# Patient Record
Sex: FEMALE | Race: WHITE | NOT HISPANIC OR LATINO | Employment: OTHER | URBAN - METROPOLITAN AREA
[De-identification: names, ages, dates, MRNs, and addresses within clinical notes are randomized per-mention and may not be internally consistent; named-entity substitution may affect disease eponyms.]

---

## 2023-12-26 ENCOUNTER — LAB (OUTPATIENT)
Dept: LAB | Facility: LAB | Age: 68
End: 2023-12-26
Payer: COMMERCIAL

## 2023-12-26 DIAGNOSIS — R73.9 HYPERGLYCEMIA, UNSPECIFIED: Primary | ICD-10-CM

## 2023-12-26 LAB
ALBUMIN SERPL BCP-MCNC: 4 G/DL (ref 3.4–5)
ALP SERPL-CCNC: 94 U/L (ref 33–136)
ALT SERPL W P-5'-P-CCNC: 13 U/L (ref 7–45)
ANION GAP SERPL CALC-SCNC: 14 MMOL/L (ref 10–20)
AST SERPL W P-5'-P-CCNC: 16 U/L (ref 9–39)
BILIRUB SERPL-MCNC: 0.7 MG/DL (ref 0–1.2)
BUN SERPL-MCNC: 19 MG/DL (ref 6–23)
CALCIUM SERPL-MCNC: 9.9 MG/DL (ref 8.6–10.6)
CHLORIDE SERPL-SCNC: 102 MMOL/L (ref 98–107)
CHOLEST SERPL-MCNC: 194 MG/DL (ref 0–199)
CHOLESTEROL/HDL RATIO: 3
CO2 SERPL-SCNC: 27 MMOL/L (ref 21–32)
CREAT SERPL-MCNC: 0.78 MG/DL (ref 0.5–1.05)
ERYTHROCYTE [DISTWIDTH] IN BLOOD BY AUTOMATED COUNT: 11.9 % (ref 11.5–14.5)
EST. AVERAGE GLUCOSE BLD GHB EST-MCNC: 128 MG/DL
GFR SERPL CREATININE-BSD FRML MDRD: 83 ML/MIN/1.73M*2
GLUCOSE SERPL-MCNC: 127 MG/DL (ref 74–99)
HBA1C MFR BLD: 6.1 %
HCT VFR BLD AUTO: 42.4 % (ref 36–46)
HDLC SERPL-MCNC: 65.4 MG/DL
HGB BLD-MCNC: 14.3 G/DL (ref 12–16)
LDLC SERPL CALC-MCNC: 112 MG/DL
MCH RBC QN AUTO: 30.5 PG (ref 26–34)
MCHC RBC AUTO-ENTMCNC: 33.7 G/DL (ref 32–36)
MCV RBC AUTO: 90 FL (ref 80–100)
NON HDL CHOLESTEROL: 129 MG/DL (ref 0–149)
NRBC BLD-RTO: 0 /100 WBCS (ref 0–0)
PLATELET # BLD AUTO: 313 X10*3/UL (ref 150–450)
POTASSIUM SERPL-SCNC: 3.8 MMOL/L (ref 3.5–5.3)
PROT SERPL-MCNC: 7 G/DL (ref 6.4–8.2)
RBC # BLD AUTO: 4.69 X10*6/UL (ref 4–5.2)
SODIUM SERPL-SCNC: 139 MMOL/L (ref 136–145)
TRIGL SERPL-MCNC: 82 MG/DL (ref 0–149)
TSH SERPL-ACNC: 2.91 MIU/L (ref 0.44–3.98)
VLDL: 16 MG/DL (ref 0–40)
WBC # BLD AUTO: 6.3 X10*3/UL (ref 4.4–11.3)

## 2023-12-26 PROCEDURE — 80061 LIPID PANEL: CPT

## 2023-12-26 PROCEDURE — 80053 COMPREHEN METABOLIC PANEL: CPT

## 2023-12-26 PROCEDURE — 84443 ASSAY THYROID STIM HORMONE: CPT

## 2023-12-26 PROCEDURE — 36415 COLL VENOUS BLD VENIPUNCTURE: CPT

## 2023-12-26 PROCEDURE — 85027 COMPLETE CBC AUTOMATED: CPT

## 2023-12-26 PROCEDURE — 83036 HEMOGLOBIN GLYCOSYLATED A1C: CPT

## 2023-12-27 ENCOUNTER — ANCILLARY PROCEDURE (OUTPATIENT)
Dept: RADIOLOGY | Facility: CLINIC | Age: 68
End: 2023-12-27
Payer: COMMERCIAL

## 2023-12-27 VITALS — BODY MASS INDEX: 33.04 KG/M2 | HEIGHT: 61 IN | WEIGHT: 175 LBS

## 2023-12-27 DIAGNOSIS — Z13.820 ENCOUNTER FOR SCREENING FOR OSTEOPOROSIS: ICD-10-CM

## 2023-12-27 DIAGNOSIS — Z12.31 ENCOUNTER FOR SCREENING MAMMOGRAM FOR MALIGNANT NEOPLASM OF BREAST: ICD-10-CM

## 2023-12-27 PROCEDURE — 77085 DXA BONE DENSITY AXL VRT FX: CPT

## 2023-12-27 PROCEDURE — 77067 SCR MAMMO BI INCL CAD: CPT

## 2024-01-03 ENCOUNTER — APPOINTMENT (OUTPATIENT)
Dept: RADIOLOGY | Facility: CLINIC | Age: 69
End: 2024-01-03
Payer: COMMERCIAL

## 2024-01-04 ENCOUNTER — OFFICE VISIT (OUTPATIENT)
Dept: PRIMARY CARE | Facility: CLINIC | Age: 69
End: 2024-01-04
Payer: COMMERCIAL

## 2024-01-04 VITALS
HEIGHT: 61 IN | WEIGHT: 183 LBS | BODY MASS INDEX: 34.55 KG/M2 | SYSTOLIC BLOOD PRESSURE: 146 MMHG | DIASTOLIC BLOOD PRESSURE: 80 MMHG

## 2024-01-04 DIAGNOSIS — Z00.00 HEALTH CARE MAINTENANCE: ICD-10-CM

## 2024-01-04 DIAGNOSIS — I10 BENIGN ESSENTIAL HYPERTENSION: ICD-10-CM

## 2024-01-04 DIAGNOSIS — K57.30 DIVERTICULOSIS OF LARGE INTESTINE WITHOUT HEMORRHAGE: Primary | ICD-10-CM

## 2024-01-04 DIAGNOSIS — E78.2 MIXED HYPERLIPIDEMIA: ICD-10-CM

## 2024-01-04 DIAGNOSIS — R73.9 HYPERGLYCEMIA: ICD-10-CM

## 2024-01-04 DIAGNOSIS — K58.0 IRRITABLE BOWEL SYNDROME WITH DIARRHEA: ICD-10-CM

## 2024-01-04 PROBLEM — R30.0 BURNING WITH URINATION: Status: ACTIVE | Noted: 2024-01-04

## 2024-01-04 PROBLEM — E78.00 PURE HYPERCHOLESTEROLEMIA: Status: ACTIVE | Noted: 2024-01-04

## 2024-01-04 PROBLEM — R01.1 HEART MURMUR, SYSTOLIC: Status: ACTIVE | Noted: 2024-01-04

## 2024-01-04 PROBLEM — Z86.79 HISTORY OF HYPERTENSION: Status: ACTIVE | Noted: 2024-01-04

## 2024-01-04 PROBLEM — R59.1 LYMPHADENOPATHY: Status: ACTIVE | Noted: 2024-01-04

## 2024-01-04 PROBLEM — R06.09 DYSPNEA ON EXERTION: Status: ACTIVE | Noted: 2024-01-04

## 2024-01-04 PROBLEM — R59.9 ADENOPATHY: Status: ACTIVE | Noted: 2024-01-04

## 2024-01-04 PROBLEM — H91.93 BILATERAL HEARING LOSS: Status: ACTIVE | Noted: 2024-01-04

## 2024-01-04 PROBLEM — K21.9 GERD (GASTROESOPHAGEAL REFLUX DISEASE): Status: ACTIVE | Noted: 2024-01-04

## 2024-01-04 PROBLEM — N20.0 CALCULUS OF KIDNEY: Status: ACTIVE | Noted: 2024-01-04

## 2024-01-04 PROBLEM — N39.0 URINARY TRACT INFECTION: Status: ACTIVE | Noted: 2024-01-04

## 2024-01-04 PROBLEM — K57.92 DIVERTICULITIS OF INTESTINE: Status: ACTIVE | Noted: 2024-01-04

## 2024-01-04 PROBLEM — N20.0 NEPHROLITHIASIS: Status: ACTIVE | Noted: 2024-01-04

## 2024-01-04 PROBLEM — E78.5 HYPERLIPIDEMIA: Status: ACTIVE | Noted: 2024-01-04

## 2024-01-04 PROBLEM — E66.9 OBESITY: Status: ACTIVE | Noted: 2024-01-04

## 2024-01-04 PROBLEM — E66.9 OBESITY WITH BODY MASS INDEX 30 OR GREATER: Status: ACTIVE | Noted: 2024-01-04

## 2024-01-04 PROBLEM — N89.8 VAGINAL DISCHARGE: Status: ACTIVE | Noted: 2024-01-04

## 2024-01-04 PROBLEM — J20.9 ACUTE BRONCHITIS: Status: ACTIVE | Noted: 2024-01-04

## 2024-01-04 PROBLEM — H61.23 HEARING LOSS OF BOTH EARS DUE TO CERUMEN IMPACTION: Status: ACTIVE | Noted: 2024-01-04

## 2024-01-04 PROBLEM — L40.9 PSORIASIS: Status: ACTIVE | Noted: 2024-01-04

## 2024-01-04 PROBLEM — K11.20 PAROTITIS: Status: ACTIVE | Noted: 2024-01-04

## 2024-01-04 PROBLEM — E78.00 FAMILIAL HYPERLIPOPROTEINEMIA TYPE IIA: Status: ACTIVE | Noted: 2024-01-04

## 2024-01-04 PROBLEM — R10.9 ABDOMINAL PAIN: Status: ACTIVE | Noted: 2024-01-04

## 2024-01-04 PROBLEM — J01.90 ACUTE SINUSITIS: Status: ACTIVE | Noted: 2024-01-04

## 2024-01-04 PROBLEM — H66.90 EAR INFECTION: Status: ACTIVE | Noted: 2024-01-04

## 2024-01-04 PROBLEM — K57.92 ACUTE DIVERTICULITIS: Status: ACTIVE | Noted: 2024-01-04

## 2024-01-04 PROBLEM — E87.6 HYPOKALEMIA: Status: ACTIVE | Noted: 2024-01-04

## 2024-01-04 PROBLEM — H60.549 ECZEMA OF EXTERNAL AUDITORY CANAL: Status: ACTIVE | Noted: 2024-01-04

## 2024-01-04 PROBLEM — R35.0 URINE FREQUENCY: Status: ACTIVE | Noted: 2024-01-04

## 2024-01-04 PROBLEM — K11.20 SIALOADENITIS: Status: ACTIVE | Noted: 2024-01-04

## 2024-01-04 PROBLEM — R31.9 HEMATURIA: Status: ACTIVE | Noted: 2024-01-04

## 2024-01-04 PROCEDURE — 1036F TOBACCO NON-USER: CPT | Performed by: INTERNAL MEDICINE

## 2024-01-04 PROCEDURE — 3077F SYST BP >= 140 MM HG: CPT | Performed by: INTERNAL MEDICINE

## 2024-01-04 PROCEDURE — 99214 OFFICE O/P EST MOD 30 MIN: CPT | Performed by: INTERNAL MEDICINE

## 2024-01-04 PROCEDURE — 3079F DIAST BP 80-89 MM HG: CPT | Performed by: INTERNAL MEDICINE

## 2024-01-04 PROCEDURE — 1159F MED LIST DOCD IN RCRD: CPT | Performed by: INTERNAL MEDICINE

## 2024-01-04 RX ORDER — LOSARTAN POTASSIUM AND HYDROCHLOROTHIAZIDE 25; 100 MG/1; MG/1
1 TABLET ORAL
Qty: 90 TABLET | Refills: 1 | Status: SHIPPED | OUTPATIENT
Start: 2024-01-04

## 2024-01-04 RX ORDER — LOSARTAN POTASSIUM AND HYDROCHLOROTHIAZIDE 25; 100 MG/1; MG/1
1 TABLET ORAL
COMMUNITY
Start: 2021-03-08 | End: 2024-01-04 | Stop reason: SDUPTHER

## 2024-01-04 ASSESSMENT — ENCOUNTER SYMPTOMS
DEPRESSION: 0
LOSS OF SENSATION IN FEET: 0
OCCASIONAL FEELINGS OF UNSTEADINESS: 0

## 2024-01-15 NOTE — PROGRESS NOTES
"Subjective   Patient ID: Rosa M Bruce is a 68 y.o. female who presents for Follow-up and Med Refill.    Med Refill    Patient is here for follow-up  Needs medication refill  Follow-up on hypertension high cholesterol  Had colonoscopy shows diverticulosis and hemorrhoids  Since then she is getting cramping diarrhea is.  She does have history of IBS      Patient is here for follow-up on results  Refused to put weight  Did not take blood pressure medication today  Wants shingles vaccine     Patient is here for physical  She is due for mammogram and bone density  She is taking blood pressure medication but she has not done blood work for last 2 years  She had psoriasis in the years uses the steroid cream and that is helping her ears  She is due for colonoscopy in 2023  She had episode of abdominal pain diarrhea constipation but resolved now     Follow-up on the results  Cerumen impaction in both ears right ear was hurting a little  She still did not do blood work for cholesterol and sugar  Blood pressure is doing okay     Patient presents with complaints of having possibly asthma feels cannot breathe has to always take deep breaths  for f/u   follow-up on hypertension needs medication refill  did not do blood work   Review of Systems    Objective   /80   Ht 1.549 m (5' 1\")   Wt 83 kg (183 lb)   BMI 34.58 kg/m²     Physical Exam  Vitals reviewed.   Constitutional:       Appearance: Normal appearance.   HENT:      Head: Normocephalic and atraumatic.      Right Ear: Tympanic membrane, ear canal and external ear normal.      Left Ear: Tympanic membrane, ear canal and external ear normal.      Nose: Nose normal.      Mouth/Throat:      Pharynx: Oropharynx is clear.   Eyes:      Extraocular Movements: Extraocular movements intact.      Conjunctiva/sclera: Conjunctivae normal.      Pupils: Pupils are equal, round, and reactive to light.   Cardiovascular:      Rate and Rhythm: Normal rate and regular rhythm.      " Pulses: Normal pulses.      Heart sounds: Normal heart sounds.   Pulmonary:      Effort: Pulmonary effort is normal.      Breath sounds: Normal breath sounds.   Abdominal:      General: Abdomen is flat. Bowel sounds are normal.      Palpations: Abdomen is soft.   Musculoskeletal:      Cervical back: Normal range of motion and neck supple.   Skin:     General: Skin is warm and dry.   Neurological:      General: No focal deficit present.      Mental Status: She is alert and oriented to person, place, and time.   Psychiatric:         Mood and Affect: Mood normal.         Assessment/Plan   Problem List Items Addressed This Visit             ICD-10-CM       Cardiac and Vasculature    Hyperlipidemia E78.5    Relevant Orders    Comprehensive Metabolic Panel    Lipid Panel    Hemoglobin A1C    Benign essential hypertension I10    Relevant Medications    losartan-hydrochlorothiazide (Hyzaar) 100-25 mg tablet    Other Relevant Orders    Comprehensive Metabolic Panel    Lipid Panel    Hemoglobin A1C       Endocrine/Metabolic    Hyperglycemia R73.9    Relevant Orders    Comprehensive Metabolic Panel    Lipid Panel    Hemoglobin A1C     Other Visit Diagnoses         Codes    Health care maintenance     Z00.00    Relevant Orders    CT cardiac scoring wo IV contrast          past recap  Physical normal  Routine blood work ordered CBC CMP fasting with TSH  EKG done shows normal sinus rhythm  Flu shot given  Mammogram ordered  Bone density ordered  Refer to Dr. thibodeaux for ptosis surgery        1/18/23  Blood work reviewed  Cholesterol is still high  Patient does not want to go on medication  Blood sugars elevated  Risk for diabetes discussed  Follow-up blood work in 6 months  Continue current medication  Shingles vaccine ordered  Again discussed diet exercise    1/4/2024  Blood pressure slightly elevated  Refills given on medication  Discussed diet and exercise  Cholesterol diet chart given  LDL has come down a little  Blood sugars  are going up  Discussed risk for diabetes  Will check CT cardiac scoring  Increase fiber in the diet  Follow-up in 6 months

## 2024-05-26 ENCOUNTER — APPOINTMENT (OUTPATIENT)
Dept: RADIOLOGY | Facility: HOSPITAL | Age: 69
End: 2024-05-26
Payer: MEDICARE

## 2024-05-26 ENCOUNTER — HOSPITAL ENCOUNTER (EMERGENCY)
Facility: HOSPITAL | Age: 69
Discharge: HOME | End: 2024-05-26
Attending: STUDENT IN AN ORGANIZED HEALTH CARE EDUCATION/TRAINING PROGRAM
Payer: MEDICARE

## 2024-05-26 ENCOUNTER — APPOINTMENT (OUTPATIENT)
Dept: CARDIOLOGY | Facility: HOSPITAL | Age: 69
End: 2024-05-26
Payer: MEDICARE

## 2024-05-26 VITALS
TEMPERATURE: 98.2 F | HEART RATE: 95 BPM | DIASTOLIC BLOOD PRESSURE: 74 MMHG | SYSTOLIC BLOOD PRESSURE: 183 MMHG | OXYGEN SATURATION: 96 % | BODY MASS INDEX: 33.99 KG/M2 | WEIGHT: 180 LBS | RESPIRATION RATE: 19 BRPM | HEIGHT: 61 IN

## 2024-05-26 DIAGNOSIS — J20.9 ACUTE BRONCHITIS, UNSPECIFIED ORGANISM: Primary | ICD-10-CM

## 2024-05-26 LAB
ALBUMIN SERPL-MCNC: 3.8 G/DL (ref 3.5–5)
ALP BLD-CCNC: 102 U/L (ref 35–125)
ALT SERPL-CCNC: 14 U/L (ref 5–40)
ANION GAP SERPL CALC-SCNC: 10 MMOL/L
AST SERPL-CCNC: 17 U/L (ref 5–40)
BASOPHILS # BLD AUTO: 0.04 X10*3/UL (ref 0–0.1)
BASOPHILS NFR BLD AUTO: 0.7 %
BILIRUB SERPL-MCNC: 0.2 MG/DL (ref 0.1–1.2)
BUN SERPL-MCNC: 17 MG/DL (ref 8–25)
CALCIUM SERPL-MCNC: 8.8 MG/DL (ref 8.5–10.4)
CHLORIDE SERPL-SCNC: 105 MMOL/L (ref 97–107)
CO2 SERPL-SCNC: 24 MMOL/L (ref 24–31)
CREAT SERPL-MCNC: 0.7 MG/DL (ref 0.4–1.6)
EGFRCR SERPLBLD CKD-EPI 2021: >90 ML/MIN/1.73M*2
EOSINOPHIL # BLD AUTO: 0.31 X10*3/UL (ref 0–0.7)
EOSINOPHIL NFR BLD AUTO: 5.7 %
ERYTHROCYTE [DISTWIDTH] IN BLOOD BY AUTOMATED COUNT: 12 % (ref 11.5–14.5)
FLUAV RNA RESP QL NAA+PROBE: NOT DETECTED
FLUBV RNA RESP QL NAA+PROBE: NOT DETECTED
GLUCOSE SERPL-MCNC: 192 MG/DL (ref 65–99)
HCT VFR BLD AUTO: 38.8 % (ref 36–46)
HGB BLD-MCNC: 13.4 G/DL (ref 12–16)
IMM GRANULOCYTES # BLD AUTO: 0.01 X10*3/UL (ref 0–0.7)
IMM GRANULOCYTES NFR BLD AUTO: 0.2 % (ref 0–0.9)
LYMPHOCYTES # BLD AUTO: 1.24 X10*3/UL (ref 1.2–4.8)
LYMPHOCYTES NFR BLD AUTO: 22.9 %
MCH RBC QN AUTO: 31.4 PG (ref 26–34)
MCHC RBC AUTO-ENTMCNC: 34.5 G/DL (ref 32–36)
MCV RBC AUTO: 91 FL (ref 80–100)
MONOCYTES # BLD AUTO: 0.42 X10*3/UL (ref 0.1–1)
MONOCYTES NFR BLD AUTO: 7.8 %
NEUTROPHILS # BLD AUTO: 3.39 X10*3/UL (ref 1.2–7.7)
NEUTROPHILS NFR BLD AUTO: 62.7 %
NRBC BLD-RTO: 0 /100 WBCS (ref 0–0)
NT-PROBNP SERPL-MCNC: 70 PG/ML (ref 0–353)
PLATELET # BLD AUTO: 228 X10*3/UL (ref 150–450)
POTASSIUM SERPL-SCNC: 3.5 MMOL/L (ref 3.4–5.1)
PROT SERPL-MCNC: 7 G/DL (ref 5.9–7.9)
RBC # BLD AUTO: 4.27 X10*6/UL (ref 4–5.2)
SARS-COV-2 RNA RESP QL NAA+PROBE: NOT DETECTED
SODIUM SERPL-SCNC: 139 MMOL/L (ref 133–145)
TROPONIN T SERPL-MCNC: 7 NG/L
WBC # BLD AUTO: 5.4 X10*3/UL (ref 4.4–11.3)

## 2024-05-26 PROCEDURE — 87636 SARSCOV2 & INF A&B AMP PRB: CPT | Performed by: PHYSICIAN ASSISTANT

## 2024-05-26 PROCEDURE — 36415 COLL VENOUS BLD VENIPUNCTURE: CPT | Performed by: PHYSICIAN ASSISTANT

## 2024-05-26 PROCEDURE — 84075 ASSAY ALKALINE PHOSPHATASE: CPT | Performed by: PHYSICIAN ASSISTANT

## 2024-05-26 PROCEDURE — 93005 ELECTROCARDIOGRAM TRACING: CPT

## 2024-05-26 PROCEDURE — 2500000004 HC RX 250 GENERAL PHARMACY W/ HCPCS (ALT 636 FOR OP/ED): Performed by: PHYSICIAN ASSISTANT

## 2024-05-26 PROCEDURE — 2500000002 HC RX 250 W HCPCS SELF ADMINISTERED DRUGS (ALT 637 FOR MEDICARE OP, ALT 636 FOR OP/ED): Performed by: PHYSICIAN ASSISTANT

## 2024-05-26 PROCEDURE — 94640 AIRWAY INHALATION TREATMENT: CPT

## 2024-05-26 PROCEDURE — 83880 ASSAY OF NATRIURETIC PEPTIDE: CPT | Performed by: PHYSICIAN ASSISTANT

## 2024-05-26 PROCEDURE — 99284 EMERGENCY DEPT VISIT MOD MDM: CPT | Mod: 25

## 2024-05-26 PROCEDURE — 96374 THER/PROPH/DIAG INJ IV PUSH: CPT

## 2024-05-26 PROCEDURE — 84484 ASSAY OF TROPONIN QUANT: CPT | Performed by: PHYSICIAN ASSISTANT

## 2024-05-26 PROCEDURE — 71046 X-RAY EXAM CHEST 2 VIEWS: CPT

## 2024-05-26 PROCEDURE — 85025 COMPLETE CBC W/AUTO DIFF WBC: CPT | Performed by: PHYSICIAN ASSISTANT

## 2024-05-26 PROCEDURE — 71046 X-RAY EXAM CHEST 2 VIEWS: CPT | Performed by: RADIOLOGY

## 2024-05-26 PROCEDURE — 96375 TX/PRO/DX INJ NEW DRUG ADDON: CPT

## 2024-05-26 RX ORDER — LORATADINE 10 MG/1
10 TABLET ORAL DAILY
Qty: 30 TABLET | Refills: 0 | Status: SHIPPED | OUTPATIENT
Start: 2024-05-26

## 2024-05-26 RX ORDER — KETOROLAC TROMETHAMINE 30 MG/ML
15 INJECTION, SOLUTION INTRAMUSCULAR; INTRAVENOUS ONCE
Status: COMPLETED | OUTPATIENT
Start: 2024-05-26 | End: 2024-05-26

## 2024-05-26 RX ORDER — IPRATROPIUM BROMIDE AND ALBUTEROL SULFATE 2.5; .5 MG/3ML; MG/3ML
3 SOLUTION RESPIRATORY (INHALATION) ONCE
Status: COMPLETED | OUTPATIENT
Start: 2024-05-26 | End: 2024-05-26

## 2024-05-26 RX ORDER — PREDNISONE 20 MG/1
TABLET ORAL DAILY
Qty: 12 TABLET | Refills: 0 | Status: SHIPPED | OUTPATIENT
Start: 2024-05-26 | End: 2024-06-03

## 2024-05-26 RX ORDER — ALBUTEROL SULFATE 90 UG/1
2 AEROSOL, METERED RESPIRATORY (INHALATION) EVERY 4 HOURS PRN
Qty: 18 G | Refills: 0 | Status: SHIPPED | OUTPATIENT
Start: 2024-05-26 | End: 2024-06-25

## 2024-05-26 RX ORDER — GUAIFENESIN 600 MG/1
1200 TABLET, EXTENDED RELEASE ORAL 2 TIMES DAILY
Qty: 28 TABLET | Refills: 0 | Status: SHIPPED | OUTPATIENT
Start: 2024-05-26 | End: 2024-06-02

## 2024-05-26 RX ADMIN — IPRATROPIUM BROMIDE AND ALBUTEROL SULFATE 3 ML: 2.5; .5 SOLUTION RESPIRATORY (INHALATION) at 14:40

## 2024-05-26 RX ADMIN — IPRATROPIUM BROMIDE AND ALBUTEROL SULFATE 3 ML: 2.5; .5 SOLUTION RESPIRATORY (INHALATION) at 14:17

## 2024-05-26 RX ADMIN — METHYLPREDNISOLONE SODIUM SUCCINATE 125 MG: 125 INJECTION, POWDER, FOR SOLUTION INTRAMUSCULAR; INTRAVENOUS at 14:13

## 2024-05-26 RX ADMIN — KETOROLAC TROMETHAMINE 15 MG: 30 INJECTION, SOLUTION INTRAMUSCULAR at 14:14

## 2024-05-26 ASSESSMENT — LIFESTYLE VARIABLES
HAVE PEOPLE ANNOYED YOU BY CRITICIZING YOUR DRINKING: NO
EVER HAD A DRINK FIRST THING IN THE MORNING TO STEADY YOUR NERVES TO GET RID OF A HANGOVER: NO
TOTAL SCORE: 0
HAVE YOU EVER FELT YOU SHOULD CUT DOWN ON YOUR DRINKING: NO
EVER FELT BAD OR GUILTY ABOUT YOUR DRINKING: NO

## 2024-05-26 ASSESSMENT — PAIN - FUNCTIONAL ASSESSMENT: PAIN_FUNCTIONAL_ASSESSMENT: 0-10

## 2024-05-26 ASSESSMENT — PAIN SCALES - GENERAL
PAINLEVEL_OUTOF10: 7
PAINLEVEL_OUTOF10: 4

## 2024-05-26 ASSESSMENT — PAIN DESCRIPTION - ORIENTATION: ORIENTATION: UPPER

## 2024-05-26 ASSESSMENT — PAIN DESCRIPTION - LOCATION: LOCATION: CHEST

## 2024-05-26 ASSESSMENT — PAIN DESCRIPTION - DIRECTION: RADIATING_TOWARDS: UPPER BACK

## 2024-05-26 NOTE — ED TRIAGE NOTES
Pt complains of being short of breath since Tuesday, states she cannot breathe and its getting worse. Pt says she has chest pains when she takes a deep breath.

## 2024-05-26 NOTE — ED PROVIDER NOTES
"HPI   Chief Complaint   Patient presents with    Shortness of Breath       This is a 68-year-old female with a past medical history of hypertension presenting to the emergency room with complaints of wheezing and productive cough since Tuesday.  Patient states that she has had worsening symptoms not of improvement.  She is coughing up green mucus.  There is no hemoptysis.  She states that she hears herself wheezing.  She states that she may have a history of \"reactive airway disease \"but she does not have any inhalers at home.  No history of COPD or asthma per patient.  Patient denies fevers.  She states that she does have pain in her chest with the coughing.  No nausea or diarrhea or vomiting.  No recent travel.  No recent surgeries.  No history of cancer.  No history of blood clots in her legs or lungs.  Not take any hormonal medications.      Please see HPI for pertinent positive and negative ROS.                   Alie Coma Scale Score: 15                     Patient History   Past Medical History:   Diagnosis Date    HTN (hypertension)     Personal history of other diseases of the circulatory system     History of hypertension     Past Surgical History:   Procedure Laterality Date    APPENDECTOMY  04/24/2013    Appendectomy    KNEE SURGERY  04/24/2013    Knee Surgery Right     Family History   Problem Relation Name Age of Onset    Breast cancer Mother  62     Social History     Tobacco Use    Smoking status: Never    Smokeless tobacco: Never   Substance Use Topics    Alcohol use: Never    Drug use: Never       Physical Exam   ED Triage Vitals [05/26/24 1323]   Temperature Heart Rate Respirations BP   36.8 °C (98.2 °F) 95 19 (!) 183/74      Pulse Ox Temp Source Heart Rate Source Patient Position   96 % Oral Monitor Sitting      BP Location FiO2 (%)     Left arm --       Physical Exam  GENERAL APPEARANCE: Awake and alert. No acute respiratory distress.  Patient is talking and smooth nondyspneic " sentences  VITAL SIGNS: As per the nurses' triage record.  HEENT: Normocephalic, atraumatic. Extraocular muscles are intact. Conjunctiva are pink. Negative scleral icterus. Mucous membranes are moist. Tongue in the midline. Oropharynx clear, uvula midline.   NECK: Soft, nontender and supple, full gross ROM, no meningeal signs.  CHEST: Nontender to palpation.  Diffuse inspiratory wheezing and rhonchi bilaterally.  Symmetric rise and fall of chest wall.   HEART: Clear S1 and S2. Regular rate and rhythm. No murmurs appreciated on auscultation.  Strong and equal pulses in the extremities.  ABDOMEN: Soft, nontender, nondistended  MUSCULOSKELETAL: The calves are nontender to palpation.  No erythema or edema of calfs bilaterally.  No pitting edema bilaterally.  Full gross active range of motion. Ambulating on own with no acute difficulties  NEUROLOGICAL: Awake, alert and oriented x 3. Motor power intact in the upper and lower extremities. Sensation is intact to light touch in the upper and lower extremities. Patient answering questions appropriately.   IMMUNOLOGICAL: No lymphatic streaking noted  DERMATOLOGIC: Warm and dry without petechiae, rashes, or ecchymosis noted on visible skin.   PYSCH: Cooperative with appropriate mood and affect.  ED Course & MDM   ED Course as of 05/26/24 1611   Sun May 26, 2024   1355 EKG interpretation: Normal sinus rhythm, rate 80.  Borderline leftward axis.  No ST or T wave abnormalities. [ML]      ED Course User Index  [ML] Milton Anderson MD         Diagnoses as of 05/26/24 1611   Acute bronchitis, unspecified organism       Medical Decision Making  Parts of this chart have been completed using voice recognition software. Please excuse any errors of transcription.  My thought process and reason for plan has been formulated from the time that I saw the patient until the time of disposition and is not specific to one specific moment during their visit and furthermore my MDM encompasses this  entire chart and not only this text box.      HPI: Detailed above.    Exam: A medically appropriate exam performed, outlined above, given the known history and presentation.    History obtained from: Patient    EKG: See my supervising physician's EKG interpretation    Medications given during visit:  Medications   ipratropium-albuteroL (Duo-Neb) 0.5-2.5 mg/3 mL nebulizer solution 3 mL (3 mL nebulization Given 5/26/24 1440)   ipratropium-albuteroL (Duo-Neb) 0.5-2.5 mg/3 mL nebulizer solution 3 mL (3 mL nebulization Given 5/26/24 1417)   methylPREDNISolone sod succinate (SOLU-Medrol) injection 125 mg (125 mg intravenous Given 5/26/24 1413)   ketorolac (Toradol) injection 15 mg (15 mg intravenous Given 5/26/24 1414)        Diagnostic/tests  Labs Reviewed   COMPREHENSIVE METABOLIC PANEL - Abnormal       Result Value    Glucose 192 (*)     Sodium 139      Potassium 3.5      Chloride 105      Bicarbonate 24      Urea Nitrogen 17      Creatinine 0.70      eGFR >90      Calcium 8.8      Albumin 3.8      Alkaline Phosphatase 102      Total Protein 7.0      AST 17      Bilirubin, Total 0.2      ALT 14      Anion Gap 10     SARS-COV-2 PCR - Normal    Coronavirus 2019, PCR Not Detected      Narrative:     This assay has received FDA Emergency Use Authorization (EUA) and is only authorized for the duration of time that circumstances exist to justify the authorization of the emergency use of in vitro diagnostic tests for the detection of SARS-CoV-2 virus and/or diagnosis of COVID-19 infection under section 564(b)(1) of the Act, 21 U.S.C. 360bbb-3(b)(1). This assay is an in vitro diagnostic nucleic acid amplification test for the qualitative detection of SARS-CoV-2 from nasopharyngeal specimens and has been validated for use at [facility]. Negative results do not preclude COVID-19 infections and should not be used as the sole basis for diagnosis, treatment, or other management decisions.     INFLUENZA A AND  B PCR - Normal    Flu A Result Not Detected      Flu B Result Not Detected      Narrative:     This assay is an in vitro diagnostic multiplex nucleic acid amplification test for the detection and discrimination of Influenza A & B from nasopharyngeal specimens, and has been validated for use at [facility]. Negative results do not preclude Influenza A/B infections, and should not be used as the sole basis for diagnosis, treatment, or other management decisions. If Influenza A/B and RSV PCR results are negative, testing for Parainfluenza virus, Adenovirus and Metapneumovirus is routinely performed for [facility] [place], and is available on other patients by placing an add-on request.   N-TERMINAL PROBNP - Normal    PROBNP 70      Narrative:     Reference ranges are based on clinical submission data. These ranges represent the 95th percentile of normal cut-off points. As NT Pro- BNP values approach 1000 pg/ml, clinical symptoms are more likely associated with CHF.   SERIAL TROPONIN, INITIAL ([facility]) - Normal    Troponin T, High Sensitivity 7     CBC WITH AUTO DIFFERENTIAL    WBC 5.4      nRBC 0.0      RBC 4.27      Hemoglobin 13.4      Hematocrit 38.8      MCV 91      MCH 31.4      MCHC 34.5      RDW 12.0      Platelets 228      Neutrophils % 62.7      Immature Granulocytes %, Automated 0.2      Lymphocytes % 22.9      Monocytes % 7.8      Eosinophils % 5.7      Basophils % 0.7      Neutrophils Absolute 3.39      Immature Granulocytes Absolute, Automated 0.01      Lymphocytes Absolute 1.24      Monocytes Absolute 0.42      Eosinophils Absolute 0.31      Basophils Absolute 0.04     TROPONIN T SERIES, HIGH SENSITIVITY (0, 2 HR, 6 HR)    Narrative:     The following orders were created for panel order Troponin T Series, High Sensitivity (0, 2HR, 6HR).  Procedure                               Abnormality         Status                     ---------                                -----------         ------                     Serial Troponin, Initial...[[id number]]  Normal              Final result               Serial Troponin, 2 Hour ...[[id number]]                                                   Please view results for these tests on the individual orders.   SERIAL TROPONIN,  2 HOUR ([facility])      XR chest 2 views   Final Result   No acute cardiopulmonary disease.        Signed by: Lizy Newberry 5/26/2024 2:46 PM   Dictation workstation:   [id number]           Considerations/further MDM:  Patient was seen in conjucntion with my supervising physician,  Dr. gomez. Please refer to his note.    Patient presents with elevated blood pressure at 183/74, temperature 98.2 °F, heart rate 95 bpm, respirations 19, 96% on room air    Differential diagnosis includes was not limited to asthma exacerbation versus COPD exacerbation versus bronchitis versus pneumonia versus ACS including myocardial infarction versus pneumothorax versus pulmonary embolism    HEART SCORE 3    Chest x-ray shows no acute cardiopulmonary process.  Negative viral swabs.  Troponin T 7.  proBNP 70.  CBC shows no leukocytosis.  CMP unremarkable.  Patient was treated with IV Toradol, DuoNebs x 2 and IV Solu-Medrol.  Due to no leukocytosis with unremarkable chest x-ray low suspicion for bacterial pneumonia or atypical pneumonia.  Higher suspicion for airway inflammation/reactive airway disease.  I will discharge patient with albuterol inhaler, prednisone, Mucinex and recommend daily Claritin or Zyrtec.  She is to follow-up with her PCP, Dr. CARTER    The patient was evaluated in the emergency department and an etiology requiring emergent treatment or admission to hospital was not identified.  All labs, imaging, and diagnostic studies were reviewed by me and the patient was counseled on clinical impression, expectations, and plan.  Patient was educated to follow-up with PCP in the following 1 to 3 days.  All questions from  patient were answered.  They elicited understanding and were agreeable to course of treatment.  Patient was discharged in stable condition and given strict return precautions including new or worsening symptoms.      Procedure  Procedures     Ariadna Koehler PA-C  05/26/24 1611       Ariadna Koehler PA-C  05/26/24 1619

## 2024-05-26 NOTE — DISCHARGE INSTRUCTIONS
Please take prednisone, guaifenesin, albuterol and loratadine as prescribed.    Please follow-up with your primary care doctor in the next 3 days.    Please return to the emergency department with new or worsening symptoms with the onset of new symptoms

## 2024-05-28 ENCOUNTER — PATIENT OUTREACH (OUTPATIENT)
Dept: CARE COORDINATION | Facility: CLINIC | Age: 69
End: 2024-05-28
Payer: MEDICARE

## 2024-05-28 NOTE — PROGRESS NOTES
[facility] ED  ED Visit 5/26/2024  C/O: Shortness of breathing, Wheezing, and Productive Cough  Dx: Acute Bronchitis    Outreach call to patient to support a smooth transition of care from recent admission.  Left voicemail message for patient with my contact information.    Rosario Simon RN/CM  [facility]  [phone]

## 2024-05-29 LAB
ATRIAL RATE: 80 BPM
P AXIS: 61 DEGREES
P OFFSET: 217 MS
P ONSET: 162 MS
PR INTERVAL: 114 MS
Q ONSET: 219 MS
QRS COUNT: 13 BEATS
QRS DURATION: 94 MS
QT INTERVAL: 368 MS
QTC CALCULATION(BAZETT): 424 MS
QTC FREDERICIA: 405 MS
R AXIS: -36 DEGREES
T AXIS: 58 DEGREES
T OFFSET: 403 MS
VENTRICULAR RATE: 80 BPM

## 2024-05-30 ENCOUNTER — OFFICE VISIT (OUTPATIENT)
Dept: PRIMARY CARE | Facility: CLINIC | Age: 69
End: 2024-05-30
Payer: MEDICARE

## 2024-05-30 VITALS — SYSTOLIC BLOOD PRESSURE: 160 MMHG | OXYGEN SATURATION: 97 % | DIASTOLIC BLOOD PRESSURE: 100 MMHG | HEART RATE: 124 BPM

## 2024-05-30 DIAGNOSIS — J20.9 ACUTE BRONCHITIS, UNSPECIFIED ORGANISM: ICD-10-CM

## 2024-05-30 PROCEDURE — 3080F DIAST BP >= 90 MM HG: CPT | Performed by: INTERNAL MEDICINE

## 2024-05-30 PROCEDURE — 1159F MED LIST DOCD IN RCRD: CPT | Performed by: INTERNAL MEDICINE

## 2024-05-30 PROCEDURE — 3077F SYST BP >= 140 MM HG: CPT | Performed by: INTERNAL MEDICINE

## 2024-05-30 PROCEDURE — 99213 OFFICE O/P EST LOW 20 MIN: CPT | Performed by: INTERNAL MEDICINE

## 2024-05-30 RX ORDER — PREDNISONE 20 MG/1
TABLET ORAL 3 TIMES DAILY
Qty: 21 TABLET | Refills: 0 | Status: SHIPPED | OUTPATIENT
Start: 2024-05-30 | End: 2024-06-14

## 2024-05-30 RX ORDER — AZITHROMYCIN 500 MG/1
500 TABLET, FILM COATED ORAL DAILY
Qty: 10 TABLET | Refills: 0 | Status: SHIPPED | OUTPATIENT
Start: 2024-05-30 | End: 2024-06-09

## 2024-05-30 RX ORDER — BUDESONIDE AND FORMOTEROL FUMARATE DIHYDRATE 160; 4.5 UG/1; UG/1
2 AEROSOL RESPIRATORY (INHALATION)
Qty: 10.2 G | Refills: 0 | Status: SHIPPED | OUTPATIENT
Start: 2024-05-30 | End: 2025-05-30

## 2024-05-30 RX ORDER — PANTOPRAZOLE SODIUM 40 MG/1
40 TABLET, DELAYED RELEASE ORAL DAILY
Qty: 30 TABLET | Refills: 0 | Status: SHIPPED | OUTPATIENT
Start: 2024-05-30 | End: 2024-07-29

## 2024-05-30 NOTE — PROGRESS NOTES
Subjective   Patient ID: Rosa M Bruce is a 68 y.o. female who presents for Follow-up (ER follow up).    Med Refill    Patient is here for ER follow-up.  She is on day 4 of prednisone but her breathing is still not improving.  She started having chest congestion cough wheezing.  Chest x-ray negative for pneumonia    Past recap   patient is here for follow-up  Needs medication refill  Follow-up on hypertension high cholesterol  Had colonoscopy shows diverticulosis and hemorrhoids  Since then she is getting cramping diarrhea is.  She does have history of IBS      Patient is here for follow-up on results  Refused to put weight  Did not take blood pressure medication today  Wants shingles vaccine     Patient is here for physical  She is due for mammogram and bone density  She is taking blood pressure medication but she has not done blood work for last 2 years  She had psoriasis in the years uses the steroid cream and that is helping her ears  She is due for colonoscopy in 2023  She had episode of abdominal pain diarrhea constipation but resolved now     Follow-up on the results  Cerumen impaction in both ears right ear was hurting a little  She still did not do blood work for cholesterol and sugar  Blood pressure is doing okay     Patient presents with complaints of having possibly asthma feels cannot breathe has to always take deep breaths  for f/u   follow-up on hypertension needs medication refill  did not do blood work   Review of Systems    Objective   BP (!) 160/100   Pulse (!) 124   SpO2 97%     Physical Exam  Vitals reviewed.   Constitutional:       Appearance: Normal appearance.   HENT:      Head: Normocephalic and atraumatic.      Right Ear: Tympanic membrane, ear canal and external ear normal.      Left Ear: Tympanic membrane, ear canal and external ear normal.      Nose: Nose normal.      Mouth/Throat:      Pharynx: Oropharynx is clear.   Eyes:      Extraocular Movements: Extraocular movements intact.       Conjunctiva/sclera: Conjunctivae normal.      Pupils: Pupils are equal, round, and reactive to light.   Cardiovascular:      Rate and Rhythm: Normal rate and regular rhythm.      Pulses: Normal pulses.      Heart sounds: Normal heart sounds.   Pulmonary:      Effort: Pulmonary effort is normal.      Breath sounds: Normal breath sounds.   Abdominal:      General: Abdomen is flat. Bowel sounds are normal.      Palpations: Abdomen is soft.   Musculoskeletal:      Cervical back: Normal range of motion and neck supple.   Skin:     General: Skin is warm and dry.   Neurological:      General: No focal deficit present.      Mental Status: She is alert and oriented to person, place, and time.   Psychiatric:         Mood and Affect: Mood normal.         Assessment/Plan   Problem List Items Addressed This Visit             ICD-10-CM       Pulmonary and Pneumonias    Acute bronchitis J20.9    Relevant Medications    predniSONE (Deltasone) 20 mg tablet    pantoprazole (ProtoNix) 40 mg EC tablet    azithromycin (Zithromax) 500 mg tablet    budesonide-formoteroL (Symbicort) 160-4.5 mcg/actuation inhaler       past recap  Physical normal  Routine blood work ordered CBC CMP fasting with TSH  EKG done shows normal sinus rhythm  Flu shot given  Mammogram ordered  Bone density ordered  Refer to Dr. thibodeaux for ptosis surgery        1/18/23  Blood work reviewed  Cholesterol is still high  Patient does not want to go on medication  Blood sugars elevated  Risk for diabetes discussed  Follow-up blood work in 6 months  Continue current medication  Shingles vaccine ordered  Again discussed diet exercise    1/4/2024  Blood pressure slightly elevated  Refills given on medication  Discussed diet and exercise  Cholesterol diet chart given  LDL has come down a little  Blood sugars are going up  Discussed risk for diabetes  Will check CT cardiac scoring  Increase fiber in the diet  Follow-up in 6 months    5/30/2024  Patient is still quite wheezy  and not showing much improvement  Will treat with Zithromax for 10 days  Stool tapering prednisone  Add Symbicort  Also add Protonix in case acid reflux is causing the symptoms to be worse  Follow-up after finishing treatment

## 2024-07-01 ENCOUNTER — APPOINTMENT (OUTPATIENT)
Dept: PRIMARY CARE | Facility: CLINIC | Age: 69
End: 2024-07-01
Payer: MEDICARE

## 2025-01-23 DIAGNOSIS — I10 BENIGN ESSENTIAL HYPERTENSION: ICD-10-CM

## 2025-01-23 DIAGNOSIS — E78.2 MIXED HYPERLIPIDEMIA: ICD-10-CM

## 2025-02-04 LAB
ALBUMIN SERPL-MCNC: 4.2 G/DL (ref 3.6–5.1)
ALP SERPL-CCNC: 91 U/L (ref 37–153)
ALT SERPL-CCNC: 15 U/L (ref 6–29)
ANION GAP SERPL CALCULATED.4IONS-SCNC: 10 MMOL/L (CALC) (ref 7–17)
AST SERPL-CCNC: 17 U/L (ref 10–35)
BILIRUB SERPL-MCNC: 0.8 MG/DL (ref 0.2–1.2)
BUN SERPL-MCNC: 15 MG/DL (ref 7–25)
CALCIUM SERPL-MCNC: 9.3 MG/DL (ref 8.6–10.4)
CHLORIDE SERPL-SCNC: 102 MMOL/L (ref 98–110)
CHOLEST SERPL-MCNC: 196 MG/DL
CHOLEST/HDLC SERPL: 3 (CALC)
CO2 SERPL-SCNC: 26 MMOL/L (ref 20–32)
CREAT SERPL-MCNC: 0.71 MG/DL (ref 0.5–1.05)
EGFRCR SERPLBLD CKD-EPI 2021: 92 ML/MIN/1.73M2
ERYTHROCYTE [DISTWIDTH] IN BLOOD BY AUTOMATED COUNT: 11.4 % (ref 11–15)
GLUCOSE SERPL-MCNC: 115 MG/DL (ref 65–99)
HCT VFR BLD AUTO: 42.1 % (ref 35–45)
HDLC SERPL-MCNC: 65 MG/DL
HGB BLD-MCNC: 14.3 G/DL (ref 11.7–15.5)
LDLC SERPL CALC-MCNC: 115 MG/DL (CALC)
MCH RBC QN AUTO: 31.4 PG (ref 27–33)
MCHC RBC AUTO-ENTMCNC: 34 G/DL (ref 32–36)
MCV RBC AUTO: 92.3 FL (ref 80–100)
NONHDLC SERPL-MCNC: 131 MG/DL (CALC)
PLATELET # BLD AUTO: 338 THOUSAND/UL (ref 140–400)
PMV BLD REES-ECKER: 9.3 FL (ref 7.5–12.5)
POTASSIUM SERPL-SCNC: 4 MMOL/L (ref 3.5–5.3)
PROT SERPL-MCNC: 6.9 G/DL (ref 6.1–8.1)
RBC # BLD AUTO: 4.56 MILLION/UL (ref 3.8–5.1)
SODIUM SERPL-SCNC: 138 MMOL/L (ref 135–146)
TRIGL SERPL-MCNC: 70 MG/DL
TSH SERPL-ACNC: 2.49 MIU/L (ref 0.4–4.5)
WBC # BLD AUTO: 7.6 THOUSAND/UL (ref 3.8–10.8)

## 2025-02-17 ENCOUNTER — APPOINTMENT (OUTPATIENT)
Dept: PRIMARY CARE | Facility: CLINIC | Age: 70
End: 2025-02-17
Payer: MEDICARE

## 2025-02-17 VITALS — SYSTOLIC BLOOD PRESSURE: 146 MMHG | DIASTOLIC BLOOD PRESSURE: 78 MMHG

## 2025-02-17 DIAGNOSIS — Z00.00 HEALTHCARE MAINTENANCE: ICD-10-CM

## 2025-02-17 DIAGNOSIS — R73.9 ELEVATED BLOOD SUGAR: ICD-10-CM

## 2025-02-17 DIAGNOSIS — Z12.31 SCREENING MAMMOGRAM, ENCOUNTER FOR: ICD-10-CM

## 2025-02-17 DIAGNOSIS — E78.2 MIXED HYPERLIPIDEMIA: ICD-10-CM

## 2025-02-17 DIAGNOSIS — I10 BENIGN ESSENTIAL HYPERTENSION: ICD-10-CM

## 2025-02-17 DIAGNOSIS — J20.9 ACUTE BRONCHITIS, UNSPECIFIED ORGANISM: ICD-10-CM

## 2025-02-17 PROCEDURE — 1124F ACP DISCUSS-NO DSCNMKR DOCD: CPT | Performed by: INTERNAL MEDICINE

## 2025-02-17 PROCEDURE — 3077F SYST BP >= 140 MM HG: CPT | Performed by: INTERNAL MEDICINE

## 2025-02-17 PROCEDURE — 1159F MED LIST DOCD IN RCRD: CPT | Performed by: INTERNAL MEDICINE

## 2025-02-17 PROCEDURE — 3078F DIAST BP <80 MM HG: CPT | Performed by: INTERNAL MEDICINE

## 2025-02-17 PROCEDURE — 99214 OFFICE O/P EST MOD 30 MIN: CPT | Performed by: INTERNAL MEDICINE

## 2025-02-17 RX ORDER — BUDESONIDE AND FORMOTEROL FUMARATE DIHYDRATE 160; 4.5 UG/1; UG/1
2 AEROSOL RESPIRATORY (INHALATION)
Qty: 10.2 G | Refills: 1 | Status: SHIPPED | OUTPATIENT
Start: 2025-02-17 | End: 2026-02-17

## 2025-02-17 RX ORDER — LOSARTAN POTASSIUM AND HYDROCHLOROTHIAZIDE 25; 100 MG/1; MG/1
1 TABLET ORAL
Qty: 90 TABLET | Refills: 1 | Status: SHIPPED | OUTPATIENT
Start: 2025-02-17

## 2025-02-17 ASSESSMENT — ENCOUNTER SYMPTOMS
OCCASIONAL FEELINGS OF UNSTEADINESS: 0
DEPRESSION: 0
LOSS OF SENSATION IN FEET: 0

## 2025-02-17 NOTE — PROGRESS NOTES
Subjective   Patient ID: Rosa M Bruce is a 69 y.o. female who presents for Follow-up (Pt refused weight and height) and Med Refill.    Med Refill    Patient is here for routine follow-up  Her blood pressure is today little high because she just took Claritin and drank some coffee  She is having trouble with the right rotator cuff  Follow-up on PFTs  Follow-up on hypertension  Did bone density up-to-date on colonoscopy    Past recap  Patient is here for ER follow-up.  She is on day 4 of prednisone but her breathing is still not improving.  She started having chest congestion cough wheezing.  Chest x-ray negative for pneumonia    Past recap   patient is here for follow-up  Needs medication refill  Follow-up on hypertension high cholesterol  Had colonoscopy shows diverticulosis and hemorrhoids  Since then she is getting cramping diarrhea is.  She does have history of IBS      Patient is here for follow-up on results  Refused to put weight  Did not take blood pressure medication today  Wants shingles vaccine     Patient is here for physical  She is due for mammogram and bone density  She is taking blood pressure medication but she has not done blood work for last 2 years  She had psoriasis in the years uses the steroid cream and that is helping her ears  She is due for colonoscopy in 2023  She had episode of abdominal pain diarrhea constipation but resolved now     Follow-up on the results  Cerumen impaction in both ears right ear was hurting a little  She still did not do blood work for cholesterol and sugar  Blood pressure is doing okay     Patient presents with complaints of having possibly asthma feels cannot breathe has to always take deep breaths  for f/u   follow-up on hypertension needs medication refill  did not do blood work   Review of Systems    Objective   /78     Physical Exam  Vitals reviewed.   Constitutional:       Appearance: Normal appearance.   HENT:      Head: Normocephalic and atraumatic.       Right Ear: Tympanic membrane, ear canal and external ear normal.      Left Ear: Tympanic membrane, ear canal and external ear normal.      Nose: Nose normal.      Mouth/Throat:      Pharynx: Oropharynx is clear.   Eyes:      Extraocular Movements: Extraocular movements intact.      Conjunctiva/sclera: Conjunctivae normal.      Pupils: Pupils are equal, round, and reactive to light.   Cardiovascular:      Rate and Rhythm: Normal rate and regular rhythm.      Pulses: Normal pulses.      Heart sounds: Normal heart sounds.   Pulmonary:      Effort: Pulmonary effort is normal.      Breath sounds: Normal breath sounds.   Abdominal:      General: Abdomen is flat. Bowel sounds are normal.      Palpations: Abdomen is soft.   Musculoskeletal:      Cervical back: Normal range of motion and neck supple.   Skin:     General: Skin is warm and dry.   Neurological:      General: No focal deficit present.      Mental Status: She is alert and oriented to person, place, and time.   Psychiatric:         Mood and Affect: Mood normal.         Assessment/Plan   Problem List Items Addressed This Visit             ICD-10-CM       Cardiac and Vasculature    Hyperlipidemia E78.5    Relevant Orders    CBC    Comprehensive Metabolic Panel    Lipid Panel    Benign essential hypertension I10    Relevant Medications    losartan-hydrochlorothiazide (Hyzaar) 100-25 mg tablet    Other Relevant Orders    CBC    Comprehensive Metabolic Panel    Lipid Panel       Pulmonary and Pneumonias    Acute bronchitis J20.9    Relevant Medications    budesonide-formoteroL (Symbicort) 160-4.5 mcg/actuation inhaler     Other Visit Diagnoses         Codes    Elevated blood sugar     R73.9    Relevant Orders    Hemoglobin A1c    Healthcare maintenance     Z00.00    Relevant Orders    CT cardiac scoring wo IV contrast    Screening mammogram, encounter for     Z12.31    Relevant Orders    BI mammo bilateral screening tomosynthesis            past recap  Physical  normal  Routine blood work ordered CBC CMP fasting with TSH  EKG done shows normal sinus rhythm  Flu shot given  Mammogram ordered  Bone density ordered  Refer to Dr. thibodeaux for ptosis surgery        1/18/23  Blood work reviewed  Cholesterol is still high  Patient does not want to go on medication  Blood sugars elevated  Risk for diabetes discussed  Follow-up blood work in 6 months  Continue current medication  Shingles vaccine ordered  Again discussed diet exercise    1/4/2024  Blood pressure slightly elevated  Refills given on medication  Discussed diet and exercise  Cholesterol diet chart given  LDL has come down a little  Blood sugars are going up  Discussed risk for diabetes  Will check CT cardiac scoring  Increase fiber in the diet  Follow-up in 6 months    5/30/2024  Patient is still quite wheezy and not showing much improvement  Will treat with Zithromax for 10 days  Stool tapering prednisone  Add Symbicort  Also add Protonix in case acid reflux is causing the symptoms to be worse  Follow-up after finishing treatment    2/17/2025  PFTs normal no signs of asthma  Bone density shows osteopenia  Discussed vitamin D calcium supplement and weightbearing exercises discussed medications option as well she will work with vitamins and exercise  Colonoscopy up-to-date  CT cardiac scoring ordered  Mammogram ordered  Blood pressure is slightly high today we will monitor  Refill on losartan  Blood work reviewed   fasting blood sugar also elevated at 115 but little better than last time  Patient just wants to work with lifestyle modification  Follow-up blood work in 6 months

## 2025-03-24 ENCOUNTER — HOSPITAL ENCOUNTER (OUTPATIENT)
Dept: RADIOLOGY | Facility: CLINIC | Age: 70
Discharge: HOME | End: 2025-03-24
Payer: MEDICARE

## 2025-03-24 ENCOUNTER — OFFICE VISIT (OUTPATIENT)
Dept: ORTHOPEDIC SURGERY | Facility: CLINIC | Age: 70
End: 2025-03-24
Payer: MEDICARE

## 2025-03-24 DIAGNOSIS — M25.511 RIGHT SHOULDER PAIN, UNSPECIFIED CHRONICITY: ICD-10-CM

## 2025-03-24 DIAGNOSIS — M75.81 TENDINITIS OF RIGHT ROTATOR CUFF: Primary | ICD-10-CM

## 2025-03-24 PROCEDURE — 1123F ACP DISCUSS/DSCN MKR DOCD: CPT | Performed by: STUDENT IN AN ORGANIZED HEALTH CARE EDUCATION/TRAINING PROGRAM

## 2025-03-24 PROCEDURE — 99204 OFFICE O/P NEW MOD 45 MIN: CPT | Performed by: STUDENT IN AN ORGANIZED HEALTH CARE EDUCATION/TRAINING PROGRAM

## 2025-03-24 PROCEDURE — 73030 X-RAY EXAM OF SHOULDER: CPT | Mod: RIGHT SIDE | Performed by: RADIOLOGY

## 2025-03-24 PROCEDURE — 73030 X-RAY EXAM OF SHOULDER: CPT | Mod: RT

## 2025-03-24 PROCEDURE — 99214 OFFICE O/P EST MOD 30 MIN: CPT | Mod: 25 | Performed by: STUDENT IN AN ORGANIZED HEALTH CARE EDUCATION/TRAINING PROGRAM

## 2025-03-24 PROCEDURE — 20610 DRAIN/INJ JOINT/BURSA W/O US: CPT | Mod: RT | Performed by: STUDENT IN AN ORGANIZED HEALTH CARE EDUCATION/TRAINING PROGRAM

## 2025-03-24 PROCEDURE — 2500000004 HC RX 250 GENERAL PHARMACY W/ HCPCS (ALT 636 FOR OP/ED): Performed by: STUDENT IN AN ORGANIZED HEALTH CARE EDUCATION/TRAINING PROGRAM

## 2025-03-25 PROCEDURE — 20610 DRAIN/INJ JOINT/BURSA W/O US: CPT | Mod: RT | Performed by: STUDENT IN AN ORGANIZED HEALTH CARE EDUCATION/TRAINING PROGRAM

## 2025-03-25 PROCEDURE — 2500000004 HC RX 250 GENERAL PHARMACY W/ HCPCS (ALT 636 FOR OP/ED): Performed by: STUDENT IN AN ORGANIZED HEALTH CARE EDUCATION/TRAINING PROGRAM

## 2025-03-25 RX ORDER — TRIAMCINOLONE ACETONIDE 40 MG/ML
40 INJECTION, SUSPENSION INTRA-ARTICULAR; INTRAMUSCULAR
Status: COMPLETED | OUTPATIENT
Start: 2025-03-25 | End: 2025-03-25

## 2025-03-25 RX ORDER — LIDOCAINE HYDROCHLORIDE 10 MG/ML
4 INJECTION, SOLUTION INFILTRATION; PERINEURAL
Status: COMPLETED | OUTPATIENT
Start: 2025-03-25 | End: 2025-03-25

## 2025-03-25 RX ADMIN — TRIAMCINOLONE ACETONIDE 40 MG: 400 INJECTION, SUSPENSION INTRA-ARTICULAR; INTRAMUSCULAR at 13:29

## 2025-03-25 RX ADMIN — LIDOCAINE HYDROCHLORIDE 4 ML: 10 INJECTION, SOLUTION INFILTRATION; PERINEURAL at 13:29

## 2025-03-25 NOTE — PROGRESS NOTES
Rosa M Bruce is a 69 y.o. year-old  female  she is a new patient to our office and presents with a chief complaint of Right shoulder pain.  Has been ongoing for a couple months.  No injury.  Has difficulty raising her arm overhead and sleeping at night.  She is a former volleyball athlete.      Past Medical, Family, and Social History reviewed     Review of Systems  A complete review of systems was conducted, pertinent only to the HPI noted above.    Physical Exam  GEN: Alert and Oriented x 3  Constitutional: Well appearing, in no apparent distress.  Eyes: sclera anicteric  ENT: hearing appropriate for normal conversation, neck appears symmetric with no gross thyromegaly  Pulm: No labored breathing, no wheezing  CVS: Regular rate and rhythm  PSY: normal mood and affect  Skin: No rashes, erythema, or induration around shoulder     Focused Musculoskeletal Exam:     Side: Right shoulder:  PROM:   FE (170)   ER 60 ABER/ABIR: 90/90  AROM:   FE (170)   ER 45 IR T8  Strength:  Supra [5/5] Infra [5/5] Subscap [5/5]  Abd [5/5]    Special Tests  Shoulder  Positive Corinna      ACJ:  AC TTP: [neg]  Cross Arm [neg]  AC prominence [no]      [Sensation intact Ax/median/ulnar/radial distributions  Motor intact Ax/median/radial/ulnar/AIN/PIN    X-rays of the shoulder independently viewed and interpreted: Mild spurring on the tuberosity.    L Inj/Asp: R subacromial bursa on 3/25/2025 1:29 PM  Indications: pain  Details: 21 G needle, posterior approach  Medications: 40 mg triamcinolone acetonide 40 mg/mL; 4 mL lidocaine 10 mg/mL (1 %)  Outcome: tolerated well, no immediate complications  Procedure, treatment alternatives, risks and benefits explained, specific risks discussed. Consent was given by the patient. Immediately prior to procedure a time out was called to verify the correct patient, procedure, equipment, support staff and site/side marked as required. Patient was prepped and draped in the usual sterile fashion.              The patient history, physical examination and imaging studies are consistent with the diagnosis above.    After a thorough discussion with the patient including expectations, I would recommend we continue a conservative program for now.  We discussed home/formal PT (deltoid isometrics, RTC strengthening, scapular stabilizers, stretching) and activity modification including avoidance of the positions and activities that provoke symptoms, including athletics.  We also discussed the ice and NSAIDS/tylenol. I provided a steroid injection today at her request. If they do not improve we'll get an MRI.    We will see them  back in 4-6 weeks for further follow up.

## 2025-05-05 ENCOUNTER — EVALUATION (OUTPATIENT)
Dept: PHYSICAL THERAPY | Facility: CLINIC | Age: 70
End: 2025-05-05
Payer: MEDICARE

## 2025-05-05 DIAGNOSIS — M25.511 RIGHT SHOULDER PAIN, UNSPECIFIED CHRONICITY: ICD-10-CM

## 2025-05-05 PROCEDURE — 97161 PT EVAL LOW COMPLEX 20 MIN: CPT | Mod: GP | Performed by: PHYSICAL THERAPIST

## 2025-05-05 NOTE — PROGRESS NOTES
Physical Therapy Evaluation and Treatment     Patient Name: Rosa M Bruce  MRN: [id number]  Encounter date: 5/5/2025  Time Calculation  Start Time: 0130  Stop Time: 0215  Time Calculation (min): 45 min  PT Evaluation Time Entry  PT Evaluation (Low) Time Entry: 15    Visit # 1 of 10  Visits/Dates Authorized: 2025: EVAL ONLY, 40.00 CO PAY, 100% [place]AGE, [place], 5900 OOP-NOT MET, ANTHEM     Current Problem:   Problem List Items Addressed This Visit    None  Visit Diagnoses         Codes      Right shoulder pain, unspecified chronicity     M25.511    Relevant Orders    Follow Up In Physical Therapy          Precautions:   none       Steadi Fall Risk  One or more falls in the last year? No  How many Times?    Was the patient injured in the fall?    Has trouble stepping onto curb? No  Advised to use a cane or walker to get around safely? No  Often has to rush to toilet? No  Feels unsteady when walking? No  Has lost some feeling in feet? No  Often feels sad or depressed? No  Steadies self on furniture while walking at home? No  Takes medicine that makes them feel lightheaded or more tired than usual? No  Worried about Falling? No  Takes medicine to sleep or improve mood? No  Needs to push with hands when rising from a chair? No       Subjective:  Per Dr Alex:  Rosa M Bruce is a 69 y.o. year-old  female  she is a new patient to our office and presents with a chief complaint of Right shoulder pain.  Has been ongoing for a couple months.  No injury.  Has difficulty raising her arm overhead and sleeping at night.  She is a former volleyball athlete.         Objective     Postural Observations  Seated posture: fair  Standing posture: fair  Correction of posture: makes symptoms better    Additional Postural Observation Details  Forward head, increased kiaphosis, rounded shoulders, R>L    Palpation     Right   Hypertonic in the infraspinatus, levator scapulae, pectoralis major, pectoralis minor, supraspinatus, teres minor and  upper trapezius. Tenderness of the infraspinatus, levator scapulae, pectoralis major, pectoralis minor, rhomboids, subscapularis, supraspinatus, teres minor, thoracic paraspinals and upper trapezius.   Trigger point to infraspinatus, levator scapulae, pectoralis major, pectoralis minor, rhomboids, subscapularis, supraspinatus, teres minor and upper trapezius.     Tenderness     Right Shoulder  Tenderness in the AC joint, acromion, biceps tendon (proximal), bicipital groove, clavicle, coracoid process, infraspinatus tendon, lateral scapula, medial scapula, scapular spine, subacromial bursa, subscapularis tendon and supraspinatus tendon. No tenderness in the SC joint.     Active Range of Motion   Left Shoulder   Normal active range of motion    Right Shoulder   Flexion: 160 degrees with pain  Abduction: 145 degrees with pain    Passive Range of Motion   Left Shoulder   Normal passive range of motion    Right Shoulder   Flexion: 165 degrees with pain  Abduction: 165 degrees with pain  External rotation 90°: 75 degrees with pain  Internal rotation 90°: 10 degrees with pain    Scapular Mobility     Right Shoulder   Scapular mobility: fair  Scapular Mobility with Shoulder to 90° FF   Scapular winging: moderate  Scapular elevation: moderate    Joint Play   Left Shoulder  Joints within functional limits are the anterior capsule, posterior capsule and inferior capsule.     Right Shoulder  Hypermobile in the anterior capsule. Hypomobile in the posterior capsule and inferior capsule.     Strength/Myotome Testing     Left Shoulder   Normal muscle strength    Right Shoulder     Planes of Motion   Flexion: 4   Abduction: 4-   External rotation at 0°: 4-   Internal rotation at 0°: 3+     Tests     Right Shoulder   Positive drop arm, empty can and painful arc.        Vitals:       Objective     Postural Observations  Seated posture: fair  Standing posture: fair  Correction of posture: makes symptoms better    Additional Postural  Observation Details  Forward head, increased kiaphosis, rounded shoulders, R>L    Palpation     Right   Hypertonic in the infraspinatus, levator scapulae, pectoralis major, pectoralis minor, supraspinatus, teres minor and upper trapezius. Tenderness of the infraspinatus, levator scapulae, pectoralis major, pectoralis minor, rhomboids, subscapularis, supraspinatus, teres minor, thoracic paraspinals and upper trapezius.   Trigger point to infraspinatus, levator scapulae, pectoralis major, pectoralis minor, rhomboids, subscapularis, supraspinatus, teres minor and upper trapezius.     Tenderness     Right Shoulder  Tenderness in the AC joint, acromion, biceps tendon (proximal), bicipital groove, clavicle, coracoid process, infraspinatus tendon, lateral scapula, medial scapula, scapular spine, subacromial bursa, subscapularis tendon and supraspinatus tendon. No tenderness in the SC joint.     Active Range of Motion   Left Shoulder   Normal active range of motion    Right Shoulder   Flexion: 160 degrees with pain  Abduction: 145 degrees with pain    Passive Range of Motion   Left Shoulder   Normal passive range of motion    Right Shoulder   Flexion: 165 degrees with pain  Abduction: 165 degrees with pain  External rotation 90°: 75 degrees with pain  Internal rotation 90°: 10 degrees with pain    Scapular Mobility     Right Shoulder   Scapular mobility: fair  Scapular Mobility with Shoulder to 90° FF   Scapular winging: moderate  Scapular elevation: moderate    Joint Play   Left Shoulder  Joints within functional limits are the anterior capsule, posterior capsule and inferior capsule.     Right Shoulder  Hypermobile in the anterior capsule. Hypomobile in the posterior capsule and inferior capsule.     Strength/Myotome Testing     Left Shoulder   Normal muscle strength    Right Shoulder     Planes of Motion   Flexion: 4   Abduction: 4-   External rotation at 0°: 4-   Internal rotation at 0°: 3+     Tests     Right Shoulder    Positive drop arm, empty can and painful arc.        Objective     Postural Observations  Seated posture: fair  Standing posture: fair  Correction of posture: makes symptoms better    Additional Postural Observation Details  Forward head, increased kiaphosis, rounded shoulders, R>L    Palpation     Right   Hypertonic in the infraspinatus, levator scapulae, pectoralis major, pectoralis minor, supraspinatus, teres minor and upper trapezius. Tenderness of the infraspinatus, levator scapulae, pectoralis major, pectoralis minor, rhomboids, subscapularis, supraspinatus, teres minor, thoracic paraspinals and upper trapezius.   Trigger point to infraspinatus, levator scapulae, pectoralis major, pectoralis minor, rhomboids, subscapularis, supraspinatus, teres minor and upper trapezius.     Tenderness     Right Shoulder  Tenderness in the AC joint, acromion, biceps tendon (proximal), bicipital groove, clavicle, coracoid process, infraspinatus tendon, lateral scapula, medial scapula, scapular spine, subacromial bursa, subscapularis tendon and supraspinatus tendon. No tenderness in the SC joint.     Active Range of Motion   Left Shoulder   Normal active range of motion    Right Shoulder   Flexion: 160 degrees with pain  Abduction: 145 degrees with pain    Passive Range of Motion   Left Shoulder   Normal passive range of motion    Right Shoulder   Flexion: 165 degrees with pain  Abduction: 165 degrees with pain  External rotation 90°: 75 degrees with pain  Internal rotation 90°: 10 degrees with pain    Scapular Mobility     Right Shoulder   Scapular mobility: fair  Scapular Mobility with Shoulder to 90° FF   Scapular winging: moderate  Scapular elevation: moderate    Joint Play   Left Shoulder  Joints within functional limits are the anterior capsule, posterior capsule and inferior capsule.     Right Shoulder  Hypermobile in the anterior capsule. Hypomobile in the posterior capsule and inferior capsule.     Strength/Myotome  Testing     Left Shoulder   Normal muscle strength    Right Shoulder     Planes of Motion   Flexion: 4   Abduction: 4-   External rotation at 0°: 4-   Internal rotation at 0°: 3+     Tests     Right Shoulder   Positive drop arm, empty can and painful arc.           Other Outcome Measures:   SPADI 18/130 = 14% impairment    Treatments:   Posture education    Manual Therapy 32298:   Pec minor stretch  GH JT mobs, inferior, distraction and focus on posterior glides  PROM  Ts results, improved IR, improved painfree arom    Assessment & Plan     Assessment  Impairments: abnormal muscle firing, abnormal or restricted ROM, activity intolerance, impaired physical strength, lacks appropriate home exercise program and pain with function  Assessment details:   Patient presents with R shoulder pain. Key impairments include: decreased ROM and strength, poor sitting and standing posture, increased swelling, and pain with functional activities such as reaching, lifting, dressing and grooming. Patient would benefit from skilled physical therapy services to address these impairments and restore normal ROM and strength to reduce pain and improving activity participation.     Prognosis: good    Goals  get rid of the pain    Plan  Therapy options: will be seen for skilled physical therapy services  Planned modality interventions: TENS, thermotherapy (hydrocollator packs), electrical stimulation/Russian stimulation, high voltage pulsed current (pain management) and ultrasound  Other planned modality interventions: DN and IASTM  Planned therapy interventions: manual therapy, postural training, neuromuscular re-education, body mechanics training, soft tissue mobilization, functional ROM exercises, stretching, strengthening, home exercise program and joint mobilization  Frequency: 2x week  Duration in weeks: 8  Treatment plan discussed with: patient          Low complexity due to patient's clinical presentation being stable and  uncomplicated by any significant comorbidities that may affect rehab tolerance and progression.     Post-Treatment Symptoms:     0/10  Goals:   Active       PT Problem       PT Goal 1       Start:  05/06/25    Expected End:  08/03/25       1. Pt will improve SPADI score to < 8% impairment  2. Pt will have 5/5 MMT R UE  3. Pt will have full R shoulder AROM and PROM with 0 pain  4. Pt will have no difficulty with sleeping due to R shoulder pain  5. Pt will be independent with HEP

## 2025-05-06 ASSESSMENT — ENCOUNTER SYMPTOMS
OCCASIONAL FEELINGS OF UNSTEADINESS: 0
DEPRESSION: 0
LOSS OF SENSATION IN FEET: 0

## 2025-05-22 ENCOUNTER — TREATMENT (OUTPATIENT)
Dept: PHYSICAL THERAPY | Facility: CLINIC | Age: 70
End: 2025-05-22
Payer: MEDICARE

## 2025-05-22 DIAGNOSIS — M25.511 RIGHT SHOULDER PAIN, UNSPECIFIED CHRONICITY: ICD-10-CM

## 2025-05-22 PROCEDURE — 97110 THERAPEUTIC EXERCISES: CPT | Mod: GP | Performed by: PHYSICAL THERAPIST

## 2025-05-22 PROCEDURE — 97140 MANUAL THERAPY 1/> REGIONS: CPT | Mod: GP | Performed by: PHYSICAL THERAPIST

## 2025-05-22 NOTE — PROGRESS NOTES
Physical Therapy Treatment    Patient Name: Rosa M Bruce  MRN: [id number]  Encounter date: 5/22/2025    Time Calculation  Start Time: 0330  Stop Time: 0415  Time Calculation (min): 45 min  PT Therapeutic Procedures Time Entry  Manual Therapy Time Entry: 16  Therapeutic Exercise Time Entry: 24    Visit # 2 of 1/4   Visits/Dates Authorized: 05/06/2025: 4V PT APPROVED 05/6/2025-06/04/2025  APPROVED CODES: 78538, 34365, 20061, 59123, 64569  2025: EVAL ONLY, 40.00 CO PAY, 100% [place], [place], 5900 OOP-NOT MET, ANTHEM     Current Problem:   Problem List Items Addressed This Visit    None  Visit Diagnoses         Codes      Right shoulder pain, unspecified chronicity     M25.511                  Subjective   General: Shoulder is feeling better, still hurts, but not nearly as much       Pre-Treatment Symptoms:    2-3/10 with arom scaption    Objective   Vitals:             Treatments:      Therapeutic Exercise 95901:   Cane flexion  ABC  Row  Tband IR/ER see below    Manual Therapy 50067:   SOR, upper trap stretch, pec minor stretch, GH jt mobs and PROM    HEP / Access Codes URL: [email or web address]  Access Code: [email or web address]  URL: [email or web address]  Date: 05/22/2025  Prepared by: Edward Ng    Exercises  - Supine Shoulder Flexion with Dowel  - 5 x daily - 7 x weekly - 3 sets - 10 reps  - ABCs  - 1 x daily - 7 x weekly - 1 sets - 1 reps  - Shoulder External Rotation with Anchored Resistance  - 1 x daily - 4 x weekly - 3 sets - 10 reps  - Shoulder Internal Rotation with Resistance  - 1 x daily - 4 x weekly - 3 sets - 10 reps  - Standing Bilateral Low Shoulder Row with Anchored Resistance  - 1 x daily - 4 x weekly - 2 sets - 15 reps  Assessment:    ROM improved. Tolerated new HEP well, will need review next session    Post-Treatment Symptoms:      1/10  Plan:    Add scaption and bi    Goals:   Active       PT Problem       PT Goal 1       Start:  05/06/25    Expected End:  08/03/25       1. Pt will improve  SPADI score to < 8% impairment  2. Pt will have 5/5 MMT R UE  3. Pt will have full R shoulder AROM and PROM with 0 pain  4. Pt will have no difficulty with sleeping due to R shoulder pain  5. Pt will be independent with HEP

## 2025-05-29 ENCOUNTER — TREATMENT (OUTPATIENT)
Dept: PHYSICAL THERAPY | Facility: CLINIC | Age: 70
End: 2025-05-29
Payer: MEDICARE

## 2025-05-29 DIAGNOSIS — M25.511 RIGHT SHOULDER PAIN, UNSPECIFIED CHRONICITY: ICD-10-CM

## 2025-05-29 PROCEDURE — 97140 MANUAL THERAPY 1/> REGIONS: CPT | Mod: GP | Performed by: PHYSICAL THERAPIST

## 2025-05-29 PROCEDURE — 97110 THERAPEUTIC EXERCISES: CPT | Mod: GP | Performed by: PHYSICAL THERAPIST

## 2025-05-29 NOTE — PROGRESS NOTES
Physical Therapy Treatment    Patient Name: Rosa M Bruce  MRN: [id number]  Encounter date: 5/29/2025    Time Calculation  Start Time: 0949  Stop Time: 1032  Time Calculation (min): 43 min  PT Therapeutic Procedures Time Entry  Manual Therapy Time Entry: 15  Therapeutic Exercise Time Entry: 26    Visit # 3 of 1/4   Visits/Dates Authorized: 05/06/2025: 4V PT APPROVED 05/6/2025-06/04/2025  APPROVED CODES: 11072, 37464, 59843, 24162, 09690  2025: EVAL ONLY, 40.00 CO PAY, 100% [place], [place], 5900 OOP-NOT MET, ANTHEM     Current Problem:   Problem List Items Addressed This Visit    None  Visit Diagnoses         Codes      Right shoulder pain, unspecified chronicity     M25.511                    Subjective   General: Pt reports that she is doing very well, using her arm for most typical daily tasks, but avoids lifting or anything that might irritate it.        Pre-Treatment Symptoms:    2-3/10 with arom scaption    Objective   Vitals:             Treatments:      Therapeutic Exercise 78561:   Cane flexion  ABC  UBE 30/30 x 5  Scaption 1-2#  10 x 3  Row yellow sports cord 20 x  Tband IR/ER yellow sport cord 10 x 3    Manual Therapy 22175:   SOR, upper trap stretch, pec minor stretch, GH jt mobs and PROM    HEP / Access Codes URL: [email or web address]  Access Code: [email or web address]  URL: [email or web address]  Date: 05/22/2025  Prepared by: Edward Ng    Exercises  - Supine Shoulder Flexion with Dowel  - 5 x daily - 7 x weekly - 3 sets - 10 reps  - ABCs  - 1 x daily - 7 x weekly - 1 sets - 1 reps  - Shoulder External Rotation with Anchored Resistance  - 1 x daily - 4 x weekly - 3 sets - 10 reps  - Shoulder Internal Rotation with Resistance  - 1 x daily - 4 x weekly - 3 sets - 10 reps  - Standing Bilateral Low Shoulder Row with Anchored Resistance  - 1 x daily - 4 x weekly - 2 sets - 15 reps  Assessment:    ROM improved. Tolerated new HEP well, will need review next session    Post-Treatment Symptoms:       1/10  Plan:    Add scaption and bi    Goals:   Active       PT Problem       PT Goal 1       Start:  05/06/25    Expected End:  08/03/25       1. Pt will improve SPADI score to < 8% impairment  2. Pt will have 5/5 MMT R UE  3. Pt will have full R shoulder AROM and PROM with 0 pain  4. Pt will have no difficulty with sleeping due to R shoulder pain  5. Pt will be independent with HEP

## 2025-06-02 ENCOUNTER — TREATMENT (OUTPATIENT)
Dept: PHYSICAL THERAPY | Facility: CLINIC | Age: 70
End: 2025-06-02
Payer: MEDICARE

## 2025-06-02 DIAGNOSIS — M25.511 RIGHT SHOULDER PAIN, UNSPECIFIED CHRONICITY: ICD-10-CM

## 2025-06-02 PROCEDURE — 97110 THERAPEUTIC EXERCISES: CPT | Mod: GP | Performed by: PHYSICAL THERAPIST

## 2025-06-02 PROCEDURE — 97140 MANUAL THERAPY 1/> REGIONS: CPT | Mod: GP | Performed by: PHYSICAL THERAPIST

## 2025-06-02 NOTE — PROGRESS NOTES
Physical Therapy Treatment    Patient Name: Rosa M Bruce  MRN: [id number]  Encounter date: 6/2/2025    Time Calculation  Start Time: 0900  Stop Time: 0945  Time Calculation (min): 45 min  PT Therapeutic Procedures Time Entry  Manual Therapy Time Entry: 15  Therapeutic Exercise Time Entry: 26    Visit # 4 of 1/4   Visits/Dates Authorized: 05/06/2025: 4V PT APPROVED 05/6/2025-06/04/2025  APPROVED CODES: 88172, 91312, 91581, 61579, 54733  2025: EVAL ONLY, 40.00 CO PAY, 100% [place], [place], 5900 OOP-NOT MET, ANTHEM     Current Problem:   Problem List Items Addressed This Visit    None  Visit Diagnoses         Codes      Right shoulder pain, unspecified chronicity     M25.511                      Subjective   General: Pt reports that she is doing very well, using her arm for most typical daily tasks, but avoids lifting or anything that might irritate it.        Pre-Treatment Symptoms:    2-3/10 with arom scaption    Objective   Vitals:             Treatments:      Therapeutic Exercise 73934:   Cane flexion  ABC  UBE 30/30 x 5  Scaption 1-2#  10 x 3  Row yellow sports cord 20 x  extension yellow sports cord 20 x  Tband IR/ER yellow sport cord 10 x 3    Manual Therapy 76293:   SOR, upper trap stretch, pec minor stretch, GH jt mobs and PROM    HEP / Access Codes URL: [email or web address]  Access Code: [email or web address]  URL: [email or web address]  Date: 05/22/2025  Prepared by: Edward Ng    Exercises  - Supine Shoulder Flexion with Dowel  - 5 x daily - 7 x weekly - 3 sets - 10 reps  - ABCs  - 1 x daily - 7 x weekly - 1 sets - 1 reps  - Shoulder External Rotation with Anchored Resistance  - 1 x daily - 4 x weekly - 3 sets - 10 reps  - Shoulder Internal Rotation with Resistance  - 1 x daily - 4 x weekly - 3 sets - 10 reps  - Standing Bilateral Low Shoulder Row with Anchored Resistance  - 1 x daily - 4 x weekly - 2 sets - 15 reps  Assessment:    ROM improved. Tolerated new HEP well, will need review next  session    Post-Treatment Symptoms:      1/10  Plan:    Add scaption and bi    Goals:   Active       PT Problem       PT Goal 1       Start:  05/06/25    Expected End:  08/03/25       1. Pt will improve SPADI score to < 8% impairment  2. Pt will have 5/5 MMT R UE  3. Pt will have full R shoulder AROM and PROM with 0 pain  4. Pt will have no difficulty with sleeping due to R shoulder pain  5. Pt will be independent with HEP

## 2025-06-10 ENCOUNTER — APPOINTMENT (OUTPATIENT)
Dept: PHYSICAL THERAPY | Facility: CLINIC | Age: 70
End: 2025-06-10
Payer: MEDICARE